# Patient Record
Sex: FEMALE | Race: WHITE | NOT HISPANIC OR LATINO | Employment: PART TIME | URBAN - METROPOLITAN AREA
[De-identification: names, ages, dates, MRNs, and addresses within clinical notes are randomized per-mention and may not be internally consistent; named-entity substitution may affect disease eponyms.]

---

## 2018-02-03 ENCOUNTER — HOSPITAL ENCOUNTER (EMERGENCY)
Facility: HOSPITAL | Age: 38
Discharge: HOME | End: 2018-02-03
Attending: EMERGENCY MEDICINE
Payer: COMMERCIAL

## 2018-02-03 VITALS
DIASTOLIC BLOOD PRESSURE: 89 MMHG | SYSTOLIC BLOOD PRESSURE: 140 MMHG | OXYGEN SATURATION: 99 % | WEIGHT: 260 LBS | RESPIRATION RATE: 20 BRPM | TEMPERATURE: 98.7 F | HEART RATE: 86 BPM

## 2018-02-03 DIAGNOSIS — R09.89 GLOBUS SENSATION: Primary | ICD-10-CM

## 2018-02-03 PROCEDURE — 99282 EMERGENCY DEPT VISIT SF MDM: CPT

## 2018-02-03 NOTE — ED PROVIDER NOTES
History  Chief Complaint   Patient presents with    Oral Swelling     Pt states she is having difficulty swallowing  Pt allergic to eggs and not sure if there were eggs in her soup earlier around 6pm  Pt states that when she tries to swallow, food and liquid sits in throat and comes right back up  61-year-old female with no significant past medical history presents for evaluation of globus sensation  At 6:00 p m  this evening she states she was eating some soup with pieces of chicken and pork as well as needles  Shortly after she felt a foreign body sensation in the midesophagus  She has been able to tolerate her secretions but any large volumes of liquids she feels shortly after drinking come back up  She denies any vomiting  She reports mild chest discomfort since the time of the foreign body sensation  She denies any wheezing or shortness of breath  No tongue swelling lip swelling or throat tightness  No hives  She states she does have an 8 allergy but feels as if there was no eggs in the soup that she 8  She does not have an EpiPen at home  She took no medications prior to arrival   She denies any shortness of breath  On exam patient resting comfortably bed no acute distress with normal vital signs  Oropharynx is clear, tolerating secretions without difficulty  No lip or tongue swelling  No wheezing bilaterally  Able to tolerate sips of carbonated beverages at bedside  None       History reviewed  No pertinent past medical history  History reviewed  No pertinent surgical history  History reviewed  No pertinent family history  I have reviewed and agree with the history as documented  Social History   Substance Use Topics    Smoking status: Never Smoker    Smokeless tobacco: Never Used    Alcohol use No        Review of Systems   Constitutional: Negative for chills, fatigue and fever     HENT: Negative for congestion, drooling, ear discharge, ear pain, facial swelling, rhinorrhea, sinus pressure, sneezing, sore throat, tinnitus, trouble swallowing and voice change  Eyes: Negative for visual disturbance  Respiratory: Negative for chest tightness and shortness of breath  Cardiovascular: Negative for chest pain and palpitations  Gastrointestinal: Negative for abdominal pain, nausea and vomiting  Musculoskeletal: Negative for back pain and gait problem  Skin: Negative for rash  Allergic/Immunologic: Positive for food allergies  Negative for immunocompromised state  Neurological: Negative for dizziness, syncope, weakness, light-headedness, numbness and headaches  Hematological: Negative for adenopathy  Physical Exam  ED Triage Vitals   Temperature Pulse Respirations Blood Pressure SpO2   02/02/18 2348 02/02/18 2347 02/02/18 2347 02/02/18 2347 02/02/18 2347   98 7 °F (37 1 °C) 104 16 (!) 178/85 100 %      Temp Source Heart Rate Source Patient Position - Orthostatic VS BP Location FiO2 (%)   02/02/18 2348 02/03/18 0155 -- -- --   Oral Monitor         Pain Score       02/02/18 2347       No Pain           Orthostatic Vital Signs  Vitals:    02/02/18 2347 02/03/18 0155   BP: (!) 178/85 140/89   Pulse: 104 86       Physical Exam   Constitutional: She is oriented to person, place, and time  Vital signs are normal  She appears well-developed and well-nourished  She does not appear ill  No distress  HENT:   Head: Normocephalic and atraumatic  Head is without abrasion and without contusion  Right Ear: Tympanic membrane normal    Left Ear: Tympanic membrane normal    Nose: Nose normal  No nasal septal hematoma  No epistaxis  Mouth/Throat: Uvula is midline, oropharynx is clear and moist and mucous membranes are normal  Mucous membranes are not dry  No oropharyngeal exudate, posterior oropharyngeal edema, posterior oropharyngeal erythema or tonsillar abscesses  Tonsils are 0 on the right  Tonsils are 0 on the left  No tonsillar exudate     Tolerating secretions without difficulty  Eyes: Conjunctivae and EOM are normal  Pupils are equal, round, and reactive to light  Neck: Trachea normal, normal range of motion, full passive range of motion without pain and phonation normal  Neck supple  No JVD present  No spinous process tenderness and no muscular tenderness present  Carotid bruit is not present  Normal range of motion present  No thyromegaly present  Cardiovascular: Normal rate, regular rhythm and intact distal pulses  Exam reveals no friction rub  No murmur heard  Pulmonary/Chest: Effort normal and breath sounds normal  No accessory muscle usage or stridor  No tachypnea  No respiratory distress  She has no decreased breath sounds  She has no wheezes  She has no rhonchi  She has no rales  She exhibits no tenderness, no crepitus, no edema and no retraction  Abdominal: Soft  Normal appearance and bowel sounds are normal  She exhibits no distension  There is no tenderness  There is no rigidity, no rebound, no guarding and no CVA tenderness  Musculoskeletal: Normal range of motion  Lymphadenopathy:     She has no cervical adenopathy  Neurological: She is alert and oriented to person, place, and time  She has normal strength  No sensory deficit  GCS eye subscore is 4  GCS verbal subscore is 5  GCS motor subscore is 6  Skin: Skin is warm, dry and intact  No rash noted  Rash is not urticarial  She is not diaphoretic  Psychiatric: She has a normal mood and affect  Nursing note and vitals reviewed  ED Medications  Medications - No data to display    Diagnostic Studies  Results Reviewed     None                 No orders to display         Procedures  Procedures      Phone Consults  ED Phone Contact    ED Course  ED Course as of Feb 03 0248   Sat Feb 03, 2018   0109 Tolerating carbonated beverages at bedside        no vomiting or spitting up since arrival  Tolerating secretions  Will dc home , give GI follow up and return precautions  MDM  CritCare Time    Disposition  Final diagnoses:   Globus sensation     Time reflects when diagnosis was documented in both MDM as applicable and the Disposition within this note     Time User Action Codes Description Comment    2/3/2018  1:58 AM Mirtha Hdz Add [F45 8] Globus sensation       ED Disposition     ED Disposition Condition Comment    Discharge  Venkat Snyder discharge to home/self care  Condition at discharge: Good        Follow-up Information     Follow up With Specialties Details Why Contact Info Additional [address] Emergency Department Emergency Medicine Go to If symptoms worsen [address] [place] [address], [address] [place] [address], [facility]    Adelina Jose MD Gastroenterology Call in 2 days As needed [address] [facility] 3 [address] [facility]  [phone]           There are no discharge medications for this patient  No discharge procedures on file  ED Provider  Attending physically available and evaluated Venkat Snyder  HAVEN managed the patient along with the ED Attending      Electronically Signed by         Francisca Del Toro DO  02/03/18 8266

## 2018-02-03 NOTE — ED ATTENDING ATTESTATION
Teresa Collado MD, saw and evaluated the patient  I have discussed the patient with the resident/non-physician practitioner and agree with the resident's/non-physician practitioner's findings, Plan of Care, and MDM as documented in the resident's/non-physician practitioner's note, except where noted  All available labs and Radiology studies were reviewed  At this point I agree with the current assessment done in the Emergency Department  I have conducted an independent evaluation of this patient including a focused history of:    Emergency Department Note- Noa Tenorio 40 y o  female MRN: [id number]    Unit/Bed#: ED 01 Encounter: [id number]    Noa Tenorio is a 40 y o  female who presents with   Chief Complaint   Patient presents with    Oral Swelling     Pt states she is having difficulty swallowing  Pt allergic to eggs and not sure if there were eggs in her soup earlier around 6pm  Pt states that when she tries to swallow, food and liquid sits in throat and comes right back up  History of Present Illness   HPI:  Noa Tenorio is a 40 y o  female who presents for evaluation of:  Fullness in throat after eating dinner  Patient denies dysphonia  Patient tolerating liquids  Review of Systems    Historical Information   History reviewed  No pertinent past medical history  History reviewed  No pertinent surgical history    Social History   History   Alcohol Use No     History   Drug Use No     History   Smoking Status    Never Smoker   Smokeless Tobacco    Never Used     Family History: non-contributory    Meds/Allergies   all medications and allergies reviewed  Allergies   Allergen Reactions    Eggs Or Egg-Derived Products        Objective   First Vitals:   Blood Pressure: (!) 178/85 (02/02/18 2347)  Pulse: 104 (02/02/18 2347)  Temperature: 98 7 °F (37 1 °C) (02/02/18 2348)  Temp Source: Oral (02/02/18 2348)  Respirations: 16 (02/02/18 2347)  Weight - Scale: 118 kg (260 lb) (02/02/18 )  SpO2: 100 % (18)    Current Vitals:   Blood Pressure: (!) 178/85 (18)  Pulse: 104 (18)  Temperature: 98 7 °F (37 1 °C) (18)  Temp Source: Oral (18)  Respirations: 16 (18)  Weight - Scale: 118 kg (260 lb) (18)  SpO2: 100 % (18)    No intake or output data in the 24 hours ending 18 0118    Invasive Devices          No matching active lines, drains, or airways          Physical Exam   Constitutional: She is oriented to person, place, and time  She appears well-developed and well-nourished  HENT:   Head: Normocephalic and atraumatic  Neck: Normal range of motion  Neck supple  Musculoskeletal: Normal range of motion  She exhibits no deformity  Neurological: She is alert and oriented to person, place, and time  Psychiatric: She has a normal mood and affect  Her behavior is normal  Judgment and thought content normal    Nursing note and vitals reviewed  Medical Decision Makin  Acute throat fullness: po fluid challenge  No results found for this or any previous visit (from the past 36 hour(s))  No orders to display         Portions of the record may have been created with voice recognition software  Occasional wrong word or "sound a like" substitutions may have occurred due to the inherent limitations of voice recognition software  Read the chart carefully and recognize, using context, where substitutions have occurred

## 2018-02-03 NOTE — DISCHARGE INSTRUCTIONS
Return with any difficulty swallowing, not able to tolerate her secretions, saliva any worsening chest discomfort any persistent vomiting or any other concerning symptoms  Follow up with GI as needed with the information provided for any continued symptoms  Esophageal Foreign Body   WHAT YOU NEED TO KNOW:   Esophageal foreign body is an object you swallowed that got stuck in your esophagus (throat)  Examples include dental work and button batteries  A piece of food or a fish bone can also become stuck in your esophagus  DISCHARGE INSTRUCTIONS:   Call 911 if:   · You have chest or abdominal pain, or shortness of breath       · You are choking  Return to the emergency department if:   · You have a fever      · You have more pain when you swallow       · You have severe vomiting       · Your vomit is bloody      · Your bowel movements are black or bloody  Contact your healthcare provider if:   · You do not find the object in your bowel movement within 2 or 3 days      · You have questions or concerns about your condition or care  Look for the object in your bowel movements: Search for the dental work, battery, or other small, smooth object each time you have a bowel movement  Do not use laxatives or stool softeners  Do not force yourself to vomit  If you swallowed another object:   · Do not stick your finger into your throat to try and remove an object  This could push the object even deeper       · Do cough  You may be able to cough out the object  Follow up with your healthcare provider as directed: You may need to return for x-rays or other tests  Write down your questions so you remember to ask them during your visits  © 2017 [employer] Information is for End User's use only and may not be sold, redistributed or otherwise used for commercial purposes  All illustrations and images included in CareNotes® are the copyrighted property of [employer]  or [employer]    The above information is an  only  It is not intended as medical advice for individual conditions or treatments   Talk to your doctor, nurse or pharmacist before following any medical regimen to see if it is safe and effective for you